# Patient Record
Sex: FEMALE | Race: WHITE | URBAN - METROPOLITAN AREA
[De-identification: names, ages, dates, MRNs, and addresses within clinical notes are randomized per-mention and may not be internally consistent; named-entity substitution may affect disease eponyms.]

---

## 2020-08-12 ENCOUNTER — HOSPITAL ENCOUNTER (OUTPATIENT)
Age: 30
Setting detail: SPECIMEN
Discharge: HOME | End: 2020-08-12
Payer: COMMERCIAL

## 2020-08-14 LAB
CYTOLOGY REPORT: NORMAL
HPV SAMPLE: NORMAL
HPV, GENOTYPE 16: NOT DETECTED
HPV, GENOTYPE 18: NOT DETECTED
HPV, HIGH RISK OTHER: NOT DETECTED
HPV, INTERPRETATION: NORMAL
SPECIMEN DESCRIPTION: NORMAL

## 2020-10-12 ENCOUNTER — TELEPHONE (OUTPATIENT)
Dept: OBGYN CLINIC | Age: 30
End: 2020-10-12

## 2020-10-12 NOTE — TELEPHONE ENCOUNTER
Pt calling stating she is recently coming off OCPs because she would like to conceive and had a very odd period. Reports last cycle was \"completely normal\" this cycle started a few days early and started off as spotting, then stopped x 2 days, then 1 day of heavy bleeding with clotting, then abruptly stopped bleeding. Advised this could be common as she is coming off of OCPs she's been using for several years. Offered serum HCG testing to r/o SAB vs monitoring menses for the next mo. Desires to monitor menses and let us know if menses worsens next cycle.